# Patient Record
Sex: MALE | Race: WHITE
[De-identification: names, ages, dates, MRNs, and addresses within clinical notes are randomized per-mention and may not be internally consistent; named-entity substitution may affect disease eponyms.]

---

## 2018-03-13 ENCOUNTER — HOSPITAL ENCOUNTER (OUTPATIENT)
Dept: HOSPITAL 45 - C.GI | Age: 78
Discharge: HOME | End: 2018-03-13
Attending: INTERNAL MEDICINE
Payer: COMMERCIAL

## 2018-03-13 VITALS
BODY MASS INDEX: 32.56 KG/M2 | BODY MASS INDEX: 32.56 KG/M2 | WEIGHT: 195.42 LBS | HEIGHT: 65 IN | HEIGHT: 65 IN | WEIGHT: 195.42 LBS

## 2018-03-13 VITALS — TEMPERATURE: 97.7 F

## 2018-03-13 VITALS — HEART RATE: 68 BPM | OXYGEN SATURATION: 96 % | DIASTOLIC BLOOD PRESSURE: 79 MMHG | SYSTOLIC BLOOD PRESSURE: 133 MMHG

## 2018-03-13 DIAGNOSIS — Z80.0: ICD-10-CM

## 2018-03-13 DIAGNOSIS — I10: ICD-10-CM

## 2018-03-13 DIAGNOSIS — Z95.1: ICD-10-CM

## 2018-03-13 DIAGNOSIS — Z87.891: ICD-10-CM

## 2018-03-13 DIAGNOSIS — E78.00: ICD-10-CM

## 2018-03-13 DIAGNOSIS — D12.2: ICD-10-CM

## 2018-03-13 DIAGNOSIS — Z79.899: ICD-10-CM

## 2018-03-13 DIAGNOSIS — J44.9: ICD-10-CM

## 2018-03-13 DIAGNOSIS — Z12.11: Primary | ICD-10-CM

## 2018-03-13 DIAGNOSIS — M19.90: ICD-10-CM

## 2018-03-13 DIAGNOSIS — Z86.010: ICD-10-CM

## 2018-03-13 DIAGNOSIS — K64.8: ICD-10-CM

## 2018-03-13 DIAGNOSIS — J45.909: ICD-10-CM

## 2018-03-13 DIAGNOSIS — E07.9: ICD-10-CM

## 2018-03-13 DIAGNOSIS — K64.4: ICD-10-CM

## 2018-03-13 DIAGNOSIS — Z79.82: ICD-10-CM

## 2018-03-13 DIAGNOSIS — Z88.0: ICD-10-CM

## 2018-03-13 DIAGNOSIS — K57.30: ICD-10-CM

## 2018-03-13 DIAGNOSIS — K63.5: ICD-10-CM

## 2018-03-13 DIAGNOSIS — I25.2: ICD-10-CM

## 2018-03-13 NOTE — ANESTHESIOLOGY PROGRESS NOTE
Anesthesia Post Op Note


Date & Time


Mar 13, 2018 at 15:32





Vital Signs





Vital Signs Past 12 Hours








  Date Time  Temp Pulse Resp B/P (MAP) Pulse Ox O2 Delivery O2 Flow Rate FiO2


 


3/13/18 15:15  61 12 104/61 (75) 96 Room Air  


 


3/13/18 13:34 36.5 52 20 165/83 (110) 98 Room Air  











Notes


Mental Status:  alert / awake / arousable, participated in evaluation


Pt Amnestic to Procedure:  Yes


Nausea / Vomiting:  adequately controlled


Pain:  adequately controlled


Airway Patency, RR, SpO2:  stable & adequate


BP & HR:  stable & adequate


Hydration State:  stable & adequate


Anesthetic Complications:  no major complications apparent

## 2018-03-13 NOTE — GI REPORT
Procedure Date: 3/13/2018 1:35 PM

Procedure:            Colonoscopy

Indications:          High risk colon cancer surveillance: Personal history 

                      of colonic polyps

Medicines:            Monitored Anesthesia Care

Complications:        No immediate complications. Estimated blood loss: 

                      Minimal.

Estimated Blood Loss: Estimated blood loss was minimal.

Procedure:            Pre-Anesthesia Assessment:

                      - Prior to the procedure, a History and Physical was 

                      performed, and patient medications, allergies and 

                      sensitivities were reviewed. The patient's tolerance of 

                      previous anesthesia was reviewed.

                      - The risks and benefits of the procedure and the 

                      sedation options and risks were discussed with the 

                      patient. All questions were answered and informed 

                      consent was obtained.

                      - Patient identification and proposed procedure were 

                      verified prior to the procedure by the physician, the 

                      nurse and the anesthetist. The procedure was verified 

                      in the procedure room.

                      - Pre-procedure physical examination revealed no 

                      contraindications to sedation.

                      - ASA Grade Assessment: III - A patient with severe 

                      systemic disease.

                      - After reviewing the risks and benefits, the patient 

                      was deemed in satisfactory condition to undergo the 

                      procedure.

                      - The anesthesia plan was to use monitored anesthesia 

                      care (MAC).

                      - Immediately prior to administration of medications, 

                      the patient was re-assessed for adequacy to receive 

                      sedatives.

                      - The heart rate, respiratory rate, oxygen saturations, 

                      blood pressure, adequacy of pulmonary ventilation, and 

                      response to care were monitored throughout the 

                      procedure.

                      - The physical status of the patient was re-assessed 

                      after the procedure.

                      After I obtained informed consent, the scope was passed 

                      under direct vision. Throughout the procedure, the 

                      patient's blood pressure, pulse, and oxygen saturations 

                      were monitored continuously. The scope was introduced 

                      through the anus and advanced to the terminal ileum. 

                      The colonoscopy was performed without difficulty. The 

                      patient tolerated the procedure well. The quality of 

                      the bowel preparation was good.

Findings:

     The digital rectal exam findings include non-thrombosed external 

     hemorrhoids. Pertinent negatives include normal sphincter tone.

     The terminal ileum appeared normal.

     A 8 mm polyp was found in the ascending colon. The polyp was sessile. 

     The polyp was removed with a cold snare. Resection and retrieval were 

     complete. Estimated blood loss was minimal.

     A 5 mm polyp was found in the sigmoid colon. The polyp was sessile. The 

     polyp was removed with a cold snare. Resection and retrieval were 

     complete. Estimated blood loss was minimal.

     Many small and large-mouthed diverticula were found in the sigmoid 

     colon, descending colon and transverse colon.

     Internal hemorrhoids were found during retroflexion. The hemorrhoids 

     were moderate.

     The exam was otherwise without abnormality.

Impression:           - Non-thrombosed external hemorrhoids found on digital 

                      rectal exam.

                      - The examined portion of the ileum was normal.

                      - One 8 mm polyp in the ascending colon, removed with a 

                      cold snare. Resected and retrieved.

                      - One 5 mm polyp in the sigmoid colon, removed with a 

                      cold snare. Resected and retrieved.

                      - Moderate diverticulosis in the sigmoid colon, in the 

                      descending colon and in the transverse colon.

                      - Internal hemorrhoids.

                      - The examination was otherwise normal.

Recommendation:       - Discharge patient to home (ambulatory).

                      - Advance diet as tolerated today.

                      - Await pathology results.

                      - Repeat colonoscopy in 5 years for surveillance based 

                      on pathology results.

                      - Return to GI office PRN.

Cayetano Luu D.O.

Cayetano Luu, 

3/13/2018 3:14:01 PM

This report has been signed electronically.

Note Initiated On: 3/13/2018 1:35 PM

     I attest to the content of the Intraoperative Record and orders 

     documented therein, exceptions below

## 2018-03-13 NOTE — ENDO HISTORY AND PHYSICAL
History & Physical


Date of Service:


Mar 13, 2018.


Chief Complaint:


Hx of colon polyps


Referring Physician:


Dr Mcclain


History of Present Illness


patient for colon polyp surveillance, last exam 3 years ago.





Past Medical History


Arthritis, Asthma, Male Genitourinary Prob., Reflux, High Cholesterol, CABG, 

Heart Disease, Hypertension, Thyroid Disease, Other





Past Surgical History


Hx Cardiac Surgery:  Yes (CABG X 3 WITH ATRIAL SEPTAL REPAIR (4/1999))


Hx Internal Defibrillator:  No


Hx Pacemaker:  No


Hx Abdominal Surgery:  No


Hx of Implantable Prosthesis:  No


Hx Post-Op Nausea and Vomiting:  No


Hx Cancer Surgery:  No


Hx Thoracic Surgery:  No


Hx Orthopedic:  No


Hx Urinary Tract Surgery:  No





Family History


Colon CA





Social History


Smoking Status:  Former Smoker


Hx Substance Use:  No


Hx Alcohol Use:  No





Allergies


Coded Allergies:  


     Penicillins (Verified  Allergy, Unknown, NAUSEA/LIGHTHEADED, 3/13/18)





Current Medications





Reported Home Medications








 Medications  Dose


 Route/Sig


 Max Daily Dose Days Date Category Dose


Instructions


 


 [place]oke [place] Plus


 Severe


 10-12.5- mg


  (Phenylephrine-Doxylamine-Dextr)


 1 Pow Pow  1 Dose


 PO PRN


    3/7/18 Reported 


 


 Ventolin Hfa


  (Albuterol) 200


 Puffs/72052 Mcg


 Aers  2 Puffs


 INH Q6H PRN


    3/7/18 Reported 


 


 Synthroid


  (Levothyroxine


 Sodium) 150 Mcg


 Tab  150 Mcg


 PO QAM


    3/7/18 Reported 


 


 Norvasc


  (Amlodipine


 Besylate) 2.5 Mg


 Tab  2.5 Mg


 PO QAM


    3/7/18 Reported 


 


 Tylenol


  (Acetaminophen)


 500 Mg Tab  1,000 Mg


 PO PRN


    3/7/18 Reported 


 


 Hctz


  (Hydrochlorothiazide)


 25 Mg Tab  25 Mg


 PO Q2D


    3/7/18 Reported 


 


 Cleocin


  (Clindamycin Hcl)


 150 Mg Cap  600 Mg


 PO PRN


    9/28/12 Reported  TAKE 4 CAPSULES AS NEEDED ONE HOUR PRIOR TO DENTAL 

PROCEDURES.


 


 Prilosec


  (Omeprazole) 20


 Mg Capcr  20 Mg


 PO QAM


    9/28/12 Reported 


 


 Lipitor


  (Atorvastatin) 20


 Mg Tab  20 Mg


 PO QAM


    9/28/12 Reported 


 


 Tenormin


  (Atenolol) 25 Mg


 Tab  12.5 Mg


 PO BID


    9/28/12 Reported 


 


 Ecotrin Or


 Generic (Aspirin)


 81 Mg Tab  81 Mg


 PO Q2D


    9/28/12 Reported  TAKES ON DAYS OPPOSITE OF HCTZ











Vital Signs


Weight (Kilograms):  88.64


Height (Feet):  5


Height (Inches):  5











  Date Time  Temp Pulse Resp B/P (MAP) Pulse Ox O2 Delivery O2 Flow Rate FiO2


 


3/13/18 13:34 36.5 52 20 165/83 (110) 98 Room Air  











Physical Exam


General Appearance:  no apparent distress


Respiratory/Chest:  


   Auscultation:  breath sounds normal





Assessment and Plan


colonoscoy for polyp surveillance.  Risks include bleeding, infection, 

perforation, pain, and missed polyps.